# Patient Record
Sex: FEMALE | Race: WHITE
[De-identification: names, ages, dates, MRNs, and addresses within clinical notes are randomized per-mention and may not be internally consistent; named-entity substitution may affect disease eponyms.]

---

## 2020-07-21 ENCOUNTER — HOSPITAL ENCOUNTER (OUTPATIENT)
Dept: HOSPITAL 62 - OROUT | Age: 35
Discharge: HOME | End: 2020-07-21
Attending: PAIN MEDICINE
Payer: COMMERCIAL

## 2020-07-21 VITALS — DIASTOLIC BLOOD PRESSURE: 66 MMHG | SYSTOLIC BLOOD PRESSURE: 140 MMHG

## 2020-07-21 DIAGNOSIS — Z79.891: ICD-10-CM

## 2020-07-21 DIAGNOSIS — Z79.899: ICD-10-CM

## 2020-07-21 DIAGNOSIS — Z87.891: ICD-10-CM

## 2020-07-21 DIAGNOSIS — E66.8: ICD-10-CM

## 2020-07-21 DIAGNOSIS — M54.16: ICD-10-CM

## 2020-07-21 DIAGNOSIS — F45.42: ICD-10-CM

## 2020-07-21 DIAGNOSIS — Z03.818: ICD-10-CM

## 2020-07-21 DIAGNOSIS — D64.9: ICD-10-CM

## 2020-07-21 DIAGNOSIS — Z88.8: ICD-10-CM

## 2020-07-21 DIAGNOSIS — M51.86: ICD-10-CM

## 2020-07-21 DIAGNOSIS — M96.1: ICD-10-CM

## 2020-07-21 DIAGNOSIS — G89.4: Primary | ICD-10-CM

## 2020-07-21 DIAGNOSIS — E11.9: ICD-10-CM

## 2020-07-21 LAB
ADD MANUAL DIFF: NO
APPEARANCE UR: CLEAR
APTT BLD: 33.7 SEC (ref 23.5–35.8)
APTT PPP: YELLOW S
BASOPHILS # BLD AUTO: 0.1 10^3/UL (ref 0–0.2)
BASOPHILS NFR BLD AUTO: 1.2 % (ref 0–2)
BILIRUB UR QL STRIP: NEGATIVE
EOSINOPHIL # BLD AUTO: 0.2 10^3/UL (ref 0–0.6)
EOSINOPHIL NFR BLD AUTO: 2.7 % (ref 0–6)
ERYTHROCYTE [DISTWIDTH] IN BLOOD BY AUTOMATED COUNT: 17.8 % (ref 11.5–14)
GLUCOSE UR STRIP-MCNC: >=500 MG/DL
HCT VFR BLD CALC: 36.6 % (ref 36–47)
HGB BLD-MCNC: 11.8 G/DL (ref 12–15.5)
INR PPP: 1.03
KETONES UR STRIP-MCNC: NEGATIVE MG/DL
LYMPHOCYTES # BLD AUTO: 1.6 10^3/UL (ref 0.5–4.7)
LYMPHOCYTES NFR BLD AUTO: 23.7 % (ref 13–45)
MCH RBC QN AUTO: 22.8 PG (ref 27–33.4)
MCHC RBC AUTO-ENTMCNC: 32.2 G/DL (ref 32–36)
MCV RBC AUTO: 71 FL (ref 80–97)
MONOCYTES # BLD AUTO: 0.5 10^3/UL (ref 0.1–1.4)
MONOCYTES NFR BLD AUTO: 7.8 % (ref 3–13)
NEUTROPHILS # BLD AUTO: 4.3 10^3/UL (ref 1.7–8.2)
NEUTS SEG NFR BLD AUTO: 64.6 % (ref 42–78)
NITRITE UR QL STRIP: NEGATIVE
PH UR STRIP: 5 [PH] (ref 5–9)
PLATELET # BLD: 187 10^3/UL (ref 150–450)
PROT UR STRIP-MCNC: NEGATIVE MG/DL
PROTHROMBIN TIME: 13.5 SEC (ref 11.4–15.4)
RBC # BLD AUTO: 5.18 10^6/UL (ref 3.72–5.28)
SP GR UR STRIP: 1.01
TOTAL CELLS COUNTED % (AUTO): 100 %
UROBILINOGEN UR-MCNC: NEGATIVE MG/DL (ref ?–2)
WBC # BLD AUTO: 6.7 10^3/UL (ref 4–10.5)

## 2020-07-21 PROCEDURE — 85730 THROMBOPLASTIN TIME PARTIAL: CPT

## 2020-07-21 PROCEDURE — 81025 URINE PREGNANCY TEST: CPT

## 2020-07-21 PROCEDURE — 85025 COMPLETE CBC W/AUTO DIFF WBC: CPT

## 2020-07-21 PROCEDURE — 63661 REMOVE SPINE ELTRD PERQ ARAY: CPT

## 2020-07-21 PROCEDURE — 93005 ELECTROCARDIOGRAM TRACING: CPT

## 2020-07-21 PROCEDURE — 36415 COLL VENOUS BLD VENIPUNCTURE: CPT

## 2020-07-21 PROCEDURE — 72100 X-RAY EXAM L-S SPINE 2/3 VWS: CPT

## 2020-07-21 PROCEDURE — 93010 ELECTROCARDIOGRAM REPORT: CPT

## 2020-07-21 PROCEDURE — 87635 SARS-COV-2 COVID-19 AMP PRB: CPT

## 2020-07-21 PROCEDURE — 85610 PROTHROMBIN TIME: CPT

## 2020-07-21 PROCEDURE — 81001 URINALYSIS AUTO W/SCOPE: CPT

## 2020-07-21 PROCEDURE — 71045 X-RAY EXAM CHEST 1 VIEW: CPT

## 2020-07-21 PROCEDURE — C9803 HOPD COVID-19 SPEC COLLECT: HCPCS

## 2020-07-21 PROCEDURE — 63688 REV/RMV IMP SP NPG/R DTCH CN: CPT

## 2020-07-21 PROCEDURE — 82947 ASSAY GLUCOSE BLOOD QUANT: CPT

## 2020-07-21 RX ADMIN — MEPERIDINE HYDROCHLORIDE PRN MG: 25 INJECTION INTRAMUSCULAR; INTRAVENOUS; SUBCUTANEOUS at 11:42

## 2020-07-21 RX ADMIN — MEPERIDINE HYDROCHLORIDE PRN MG: 25 INJECTION INTRAMUSCULAR; INTRAVENOUS; SUBCUTANEOUS at 11:47

## 2020-07-21 NOTE — RADIOLOGY REPORT (SQ)
EXAM DESCRIPTION:  CHEST SINGLE VIEW



IMAGES COMPLETED DATE/TIME:  7/21/2020 7:59 am



REASON FOR STUDY:  PRE OP



COMPARISON:  None.



EXAM PARAMETERS:  NUMBER OF VIEWS: One view.

TECHNIQUE: Single frontal radiographic view of the chest acquired.

RADIATION DOSE: NA

LIMITATIONS: None.



FINDINGS:  LUNGS AND PLEURA: No opacities, masses or pneumothorax. No pleural effusion.

MEDIASTINUM AND HILAR STRUCTURES: No masses.  Contour normal.

HEART AND VASCULAR STRUCTURES: Heart normal in size.  Normal vasculature.

BONES: No acute findings.

HARDWARE: None in the chest.

OTHER: No other significant finding.



IMPRESSION:  NO ACUTE RADIOGRAPHIC FINDING IN THE CHEST.



TECHNICAL DOCUMENTATION:  JOB ID:  [id number]

 2011 [employer]- All Rights Reserved



Reading location - IP/workstation name: [email or web address]

## 2020-07-21 NOTE — RADIOLOGY REPORT (SQ)
EXAM DESCRIPTION:  L SPINE 2 VIEWS; NO CHG FLUORO



IMAGES COMPLETED DATE/TIME:  7/21/2020 12:36 pm



REASON FOR STUDY:  SPINAL STIMULATOR REMOVAL ASSISTED WITH FLUOROSCOPY IN OR G89.4  CHRONIC PAIN SYND
MAIRA M54.16  RADICULOPATHY, LUMBAR REGION



COMPARISON:  None.



FLUOROSCOPY TIME:  1.1 minute.

12 images saved to PACS.



TECHNIQUE:  Intra-operative images acquired during surgical procedure to evaluate progress.

NUMBER OF IMAGES: 12 images.



LIMITATIONS:  None.



FINDINGS:  Images of the lumbosacral region acquired during the procedure.



IMPRESSION:  IMAGE(S) OBTAINED DURING PROCEDURE.



COMMENT:  Quality :  Final reports for procedures using fluoroscopy that document radiation exp
osure indices, or exposure time and number of fluorographic images (if radiation exposure indices are
 not available)

Please consult full operative report of the attending physician for description of the procedure.



TECHNICAL DOCUMENTATION:  JOB ID:  [id number]

 2011 [employer]- All Rights Reserved



Reading location - IP/workstation name: [email or web address]

## 2020-07-21 NOTE — OPERATIVE REPORT
Operative Report


DATE OF SURGERY: 07/21/20


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


POSTOPERATIVE DIAGNOSIS: Same


OPERATION: Removal Implantable pulse generator for Spinal Cord stimulator and 

removal of dorsal root ganglion leads.


SURGEON: GUILLE CORRALES


1ST ASSISTANT: LINN CHACON


ANESTHESIA: Moderate Sedation


TISSUE REMOVED OR ALTERED: None


COMPLICATIONS: 





None


ESTIMATED BLOOD LOSS: 5mL


INTRAOPERATIVE FINDINGS: Two of three dorsal root ganglion leads removed without

incident.  One additional lead remaining was left in situ.


PROCEDURE: 








Date of Surgery: July 21, 2020


Preoperative Diagnosis: Nonfunctional spinal cord stimulation system


Postoperative Diagnosis:Same


Procedure: SCS Battery Removal, Lead Removal


Surgeon: Guille Corrales MD


Assistant: Linn Chacon MD


Anesthesia: MAC


Complications: None


Procedure Detail:


After obtaining informed consent and advising the patient of the risks and 

benefits, including serious neurological injury, bleeding and infection, 

allergic reaction and death, the patient was taken to the operating room. The 

patient was placed comfortably in the prone position. Comfort was assessed 

visually and verbally. The patient was then prepped with chlorhexidine with a 

suitable drying time prior to drapping.


The pulse generator was readily palpable and site marked. The previous 

incisional scar was anesthetized with 1% lidocaine with bicarbonate, followed by

bupivacaine 0.25% with epinephrine. Sharp and blunt dissection were performed 

down to the pulse generator taking care to avoid the SCS wires. This was readily

identified. Electrocautery was minimally necessary for hemostasis. The old ge

nerator was removed easily.  After the leads were disconnected from the battery 

2 of the 3 dorsal root ganglion leads were easily removed under gentle pressure.

 One lead on the right side near the S1 nerve root however did not release 

easily with gentle pressure.  This also reproduced pain in the patient's back.  

Decision was made at this juncture to leave the lead in place.  


The wound was then copiously irrigated with Betadine containing irrigation 

solution. The site was then closed with interrupted vertical mattress sutures 

with 3-0 Polysorb in 2 layers. The skin came together nicely. The region was 

cleansed again followed by placement of dermabond tape and cement. When this was

dry, suitable tegaderm sponge dressing was placed. The patient was then taken 

back to PACU for postoperative care and monitoring.